# Patient Record
Sex: FEMALE | URBAN - METROPOLITAN AREA
[De-identification: names, ages, dates, MRNs, and addresses within clinical notes are randomized per-mention and may not be internally consistent; named-entity substitution may affect disease eponyms.]

---

## 2018-04-12 ENCOUNTER — HOSPITAL ENCOUNTER (OUTPATIENT)
Dept: PHYSICAL THERAPY | Age: 23
Discharge: HOME | End: 2018-04-12
Payer: OTHER GOVERNMENT

## 2018-04-12 PROCEDURE — 97162 PT EVAL MOD COMPLEX 30 MIN: CPT

## 2018-04-12 NOTE — PROGRESS NOTES
[facility] at [address] [place] [address]  Phone: [phone]   Fax: [phone]      Plan of Care/ Statement of Necessity for Physical Therapy Services    Patient name: Angela Rosario Start of Care: 2018   Referral source: Anu HuitronzulemamaliDO : 1995    Medical Diagnosis: Stress incontinence [N39.3]   Onset Date:2017    Treatment Diagnosis: Prolapse, Dyspaurenia, and Mixed incontinence    Prior Hospitalization: see medical history Provider#: [id number]   Medications: Verified on Patient summary List    Comorbidities: latex allergy     Prior Level of Function: patient is independent with ADLs and activities          The Plan of Care and following information is based on the information from the initial evaluation. Assessment/ key information:   Patient is a 20year old female who presents with mixed urinary incontinence issues, cystocele, and dyspareunia. Patient reports she started having the issues after having her child in 2017. She had a long labor of about 3 days and some tearing but not specified the severity of tear. Patient reports she does not have pain at rest however with  recreational activities and PT in the [employer] in anterior pelvic region more pressure/heaviness. Marinoff Scale 3/3. Voiding frequency of 5-6X/day and denies nocturia. Leakage episodes of about 2X/week when started and last occurrence was about 3 weeks ago. Examination revealed patient had hypertonicity left greater than right side, trigger points left>right, PFM MMT of 2/5 bilaterally with increase poor release on left side, and cystocele of grade II with POP assessment. Patient would benefit from skilled PT services to address deficits and improve patient's functional mobility and activity tolerance.    Evaluation Complexity History HIGH Complexity :3+ comorbidities / personal factors will impact the outcome/ POC ; Examination MEDIUM Complexity : 3 Standardized tests and measures addressing body structure, function, activity limitation and / or participation in recreation  ;Presentation MEDIUM Complexity : Evolving with changing characteristics  ; Clinical Decision Making MEDIUM Complexity : FOTO score of 26-74  Overall Complexity Rating: MEDIUM    Problem List: Pelvic pain/dysfunction, Decreased pelvic floor mm awareness, Decreased pelvic floor mm strength, Use of accessory muscles, Improper voiding habits, Hypertonus of pelvic floor and Urinary urgency    Treatment Plan may include any combination of the following:   Therapeutic exercise, Urge suppression techniques, Neuromuscular re-education, Manual therapy, Physical agent/modality, Patient education and Other  Patient / Family readiness to learn indicated by: asking questions, trying to perform skills and interest    Persons(s) to be included in education: patient (P)    Barriers to Learning/Limitations: None    Patient Goal (s): to decrease pressure/discomfort    Patient Self Reported Health Status: good    Rehabilitation Potential: good  Short Term Goals: To be accomplished in 4  weeks:   1. Pt will demonstrate home exercise program accurately/including isolated PFM contractions as adjunct to PT clinic visits to promote healthy lifestyle and increased quality of life. Status  @ Eval: initiate as indicated  2. Pt will have reduced symptoms of POP (perineal heaviness/pressure) by 25-50% by pt report for improved function and increased quality of life. Status @ Eval: 0%  3. Pt will report pain decreased by 50% or greater for increased quality of life. Status @ Eval: 0% and occurs with lifting or recreational activities  4. Patient will perform bladder diary to review with PT to increase bladder management and decrease leakages. Status @ Eval: will give as indicated as patient has not had leakage episode in 3 weeks    Long Term Goals: To be accomplished in 8  weeks:  1.  Pt will demonstrate increased PFM motor performance by 1/2-1 grade for more normal function. Status @ Eval: 2/5 left MMT  2. Pt will report pain 0/10 for improved quality of life. Status @ Eval: 0/10 at rest  3. Pt will have FOTO score change of 13 points indicating improvement in function. Status @ Eval: Urinary Problem 53  4. Patient will be independent with HEP at time of discharge to be able to continue with maintenance program.   Status @ Eval: initiate 2nd visit     Frequency / Duration: Patient to be seen 1 times per week for 8 weeks. Patient/ Caregiver education and instruction: Diagnosis, prognosis, Other POC   [x]  Plan of care has been reviewed with LORENA Victoria 4/12/2018 11:19 AM    ________________________________________________________________________    I certify that the above Therapy Services are being furnished while the patient is under my care. I agree with the treatment plan and certify that this therapy is necessary.     Physician's Signature:___________________  Date:____________Time: _________    Please sign and return to [facility] at [address] [place] [address]     Phone: [phone]   Fax: [phone]

## 2018-04-12 NOTE — PROGRESS NOTES
PF EVALUATION/TREATMENT NOTE     Patient Name: Kayden Costello  Date:2018  : 1995  [x]  Patient  Verified  Payor:  / Plan: [facility] / Product Type:  /    In time:11:20  Out time:12:10  Total Treatment Time (min): 50  Visit #: 1 of 8    Treatment Area: [x] Pelvic Floor     [] Other:    SUBJECTIVE  Any medication changes, allergies to medications, adverse drug reactions, diagnosis change, or new procedure performed?: [x] No    [] Yes (see summary sheet for update)  SEE POC    Pain Level : 0/10  OBJECTIVE     Pelvic Floor Dysfunction Evaluation    Musculoskeletal Screen: N/A    Skin Integrity:  [x] Healthy [] Red  [] Labia Atrophy [] Fragile    Sensation: [x] Intact [] Diminished:    Muscle Bulk: [x] Symmetrical  [] Well-developed [] Atrophied:  []L   []R   []B    Prolapse: [x] Cystocele: Grade II  [] Rectocele:    PERF Score (Performance/Endurance/Repetitions/Flicks)   P: 2 E: R:  F:  Total:    Accessory Muscle Use:     Patient has failed previous pelvic floor muscle training? [] Yes    [] No N/A    50 min [x]Eval                  []Re-Eval     Other Objective/Functional Measures:    FOTO: Urinary Problem 53  Refer to POC    Pain Level (0-10 scale) post treatment: 0/10    ASSESSMENT:     [x]  See Plan of Care  []  See progress note/recertification  []  See Discharge Summary         Progress towards goals / Updated goals:  SEE POC    PLAN  []  Upgrade activities as tolerated     [x]  Continue plan of care  []  Update interventions per flow sheet       []  Discharge due to:_  []  Other:Jason Dennison 2018  11:19 AM

## 2018-04-20 ENCOUNTER — HOSPITAL ENCOUNTER (OUTPATIENT)
Dept: PHYSICAL THERAPY | Age: 23
Discharge: HOME | End: 2018-04-20
Payer: OTHER GOVERNMENT

## 2018-04-20 PROCEDURE — 97110 THERAPEUTIC EXERCISES: CPT

## 2018-04-20 NOTE — PROGRESS NOTES
PF DAILY TREATMENT NOTE 3-16    Patient Name: Kendrick Sosa  Date:2018  : 1995  [x]  Patient  Verified  Payor:  / Plan: [facility]  [facility] / Product Type:  /    In time: 02:07  Out time:02:30  Total Treatment Time (min): 23  Visit #: 2 of 8    Treatment Area: [x] Pelvic Floor     [] Other:    SUBJECTIVE  Pain Level (0-10 scale): 0/10  Any medication changes, allergies to medications, adverse drug reactions, diagnosis change, or new procedure performed?: [x] No    [] Yes (see summary sheet for update)  Subjective functional status/changes:   [] No changes reported    OBJECTIVE    23 min Therapeutic Exercise:  [] See flow sheet :   []  Pelvic floor strengthening                 []  Pelvic floor downtraining  []  Quality pelvic floor contractions       []  Relaxation techniques  []  Urge suppression exercises  []  Other:  Rationale: increase ROM, increase strength, improve coordination and improve balance  to improve the patients ability to increase PFM relaxation     Other Objective/Functional Measures:     Pain Level (0-10 scale) post treatment: 0/10    ASSESSMENT/Changes in Function:   Patient demonstrated good form with exercises after initial cuing. PT progressed HEP based on performance.      []  Decrease # of leaks   [] No change []  Improving [] Resolved     []  Decrease hypertonus [] No change []  Improving [] Resolved     []  Increase void interval [] No change []  Improving [] Resolved     []  Increase PF strength [] No change []  Improving [] Resolved     []  Increase PF endurance [] No change []  Improving [] Resolved     []  Increase endurance [] No change []  Improving [] Resolved     []  Decrease # of pads [] No change []  Improving [] Resolved     []  Decrease pain [] No change []  Improving [] Resolved     [x]  Increased coordination [] No change [x]  Improving [] Resolved     []  Increased Bowel Frequency [] No change []  Improving [] Resolved       Patient will continue to benefit from skilled PT services to modify and progress therapeutic interventions, address functional mobility deficits, address ROM deficits, address strength deficits, analyze and address soft tissue restrictions and analyze and cue movement patterns to attain remaining goals. []  See Plan of Care  []  See progress note/recertification  []  See Discharge Summary         Progress towards goals / Updated goals:  Short Term Goals: To be accomplished in 4  weeks:   1. Pt will demonstrate home exercise program accurately/including isolated PFM contractions as adjunct to PT clinic visits to promote healthy lifestyle and increased quality of life. Status  @ Eval: initiate as indicated  Current: initiated 2nd visit  2. Pt will have reduced symptoms of POP (perineal heaviness/pressure) by 25-50% by pt report for improved function and increased quality of life. Status @ Eval: 0%  3. Pt will report pain decreased by 50% or greater for increased quality of life. Status @ Eval: 0% and occurs with lifting or recreational activities  4. Patient will perform bladder diary to review with PT to increase bladder management and decrease leakages. Status @ Eval: will give as indicated as patient has not had leakage episode in 3 weeks     Long Term Goals: To be accomplished in 8  weeks:  1. Pt will demonstrate increased PFM motor performance by 1/2-1 grade for more normal function. Status @ Eval: 2/5 left MMT  2. Pt will report pain 0/10 for improved quality of life. Status @ Eval: 0/10 at rest  3. Pt will have FOTO score change of 13 points indicating improvement in function. Status @ Eval: Urinary Problem 53  4.  Patient will be independent with HEP at time of discharge to be able to continue with maintenance program.   Status @ Eval: initiate 2nd visit     PLAN  []  Upgrade activities as tolerated     [x]  Continue plan of care  []  Update interventions per flow sheet       []  Discharge due to:_  [] Other:_      Milta Ill 4/20/2018  2:09 PM    Future Appointments  Date Time Provider Department Center   4/26/2018 11:30 AM [place] [facility]   5/3/2018 1:45 PM [facility]

## 2018-04-26 ENCOUNTER — APPOINTMENT (OUTPATIENT)
Dept: PHYSICAL THERAPY | Age: 23
End: 2018-04-26
Payer: OTHER GOVERNMENT

## 2018-07-26 NOTE — PROGRESS NOTES
[facility] at [address] [place] [address]  Phone: [phone]   Fax: [phone]    Discharge Summary    Patient name: Foster Lopez Start of Care: 2018   Referral source: Margarita Chacon DO : 1995                         Medical Diagnosis: Stress incontinence [N39.3] Onset Date:2017                         Treatment Diagnosis: Prolapse, Dyspaurenia, and Mixed incontinence    Prior Hospitalization: see medical history Provider#: [id number]   Medications: Verified on Patient summary List    Comorbidities: latex allergy     Prior Level of Function: patient is independent with ADLs and activities   Medications: Verified on Patient Summary List    Visits from Start of Care: 2    Missed Visits: 2  Reporting Period : 2018 to 2018    Short Term Goals: To be accomplished in 4  weeks:   1. Pt will demonstrate home exercise program accurately/including isolated PFM contractions as adjunct to PT clinic visits to promote healthy lifestyle and increased quality of life. Status  @ Eval: initiate as indicated  Current: initiated 2nd visit  Status at discharge: not met    2. Pt will have reduced symptoms of POP (perineal heaviness/pressure) by 25-50% by pt report for improved function and increased quality of life. Status @ Eval: 0%  Status at discharge: not met    3. Pt will report pain decreased by 50% or greater for increased quality of life. Status @ Eval: 0% and occurs with lifting or recreational activities  Status at discharge: not met    4. Patient will perform bladder diary to review with PT to increase bladder management and decrease leakages. Status @ Eval: will give as indicated as patient has not had leakage episode in 3 weeks   Status at discharge: not met    [address] be accomplished in 8  weeks:  1. Pt will demonstrate increased PFM motor performance by 1/2-1 grade for more normal function.    Status @ Eval: 2/5 left MMT  Status at discharge: not met    2. Pt will report pain 0/10 for improved quality of life. Status @ Eval: 0/10 at rest  Status at discharge: not met    3. Pt will have FOTO score change of 13 points indicating improvement in function. Status @ Eval: Urinary Problem 53  Status at discharge: not met    4. Patient will be independent with HEP at time of discharge to be able to continue with maintenance program.   Status @ Eval: initiate 2nd visit   Status at discharge: not met      Assessment/ Summary of Care: Patient is non-compliant, only had 1 treatment session.      RECOMMENDATIONS:  [x]Discontinue therapy: []Patient has reached or is progressing toward set goals      [x]Patient is non-compliant or has abdicated      []Due to lack of appreciable progress towards set goals    Arnol Ritter, PT 7/26/2018 8:37 AM